# Patient Record
Sex: MALE | Race: WHITE
[De-identification: names, ages, dates, MRNs, and addresses within clinical notes are randomized per-mention and may not be internally consistent; named-entity substitution may affect disease eponyms.]

---

## 2021-09-13 ENCOUNTER — HOSPITAL ENCOUNTER (INPATIENT)
Dept: HOSPITAL 35 - ER | Age: 51
LOS: 15 days | Discharge: NURSING, REHAB OR LONG-TERM CARE | DRG: 177 | End: 2021-09-28
Attending: INTERNAL MEDICINE | Admitting: INTERNAL MEDICINE
Payer: COMMERCIAL

## 2021-09-13 VITALS — DIASTOLIC BLOOD PRESSURE: 95 MMHG | SYSTOLIC BLOOD PRESSURE: 170 MMHG

## 2021-09-13 VITALS — BODY MASS INDEX: 37.8 KG/M2 | HEIGHT: 75 IN | WEIGHT: 304 LBS

## 2021-09-13 DIAGNOSIS — E66.01: ICD-10-CM

## 2021-09-13 DIAGNOSIS — I11.0: ICD-10-CM

## 2021-09-13 DIAGNOSIS — U07.1: Primary | ICD-10-CM

## 2021-09-13 DIAGNOSIS — F41.1: ICD-10-CM

## 2021-09-13 DIAGNOSIS — J96.22: ICD-10-CM

## 2021-09-13 DIAGNOSIS — J12.82: ICD-10-CM

## 2021-09-13 DIAGNOSIS — R53.81: ICD-10-CM

## 2021-09-13 DIAGNOSIS — J96.21: ICD-10-CM

## 2021-09-13 DIAGNOSIS — Z89.512: ICD-10-CM

## 2021-09-13 DIAGNOSIS — Z83.3: ICD-10-CM

## 2021-09-13 DIAGNOSIS — E87.6: ICD-10-CM

## 2021-09-13 DIAGNOSIS — E87.8: ICD-10-CM

## 2021-09-13 DIAGNOSIS — F41.9: ICD-10-CM

## 2021-09-13 DIAGNOSIS — I50.33: ICD-10-CM

## 2021-09-13 DIAGNOSIS — E11.9: ICD-10-CM

## 2021-09-13 DIAGNOSIS — F32.9: ICD-10-CM

## 2021-09-13 DIAGNOSIS — T87.9: ICD-10-CM

## 2021-09-13 DIAGNOSIS — E87.1: ICD-10-CM
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
## 2021-09-13 DIAGNOSIS — E43: ICD-10-CM

## 2021-09-13 LAB
ALBUMIN SERPL-MCNC: 2.7 G/DL (ref 3.4–5)
ALT SERPL-CCNC: 22 U/L (ref 16–63)
ANION GAP SERPL CALC-SCNC: 8 MMOL/L (ref 7–16)
AST SERPL-CCNC: 28 U/L (ref 15–37)
BASOPHILS NFR BLD AUTO: 0.7 % (ref 0–2)
BE(VIVO): 1.5 MMOL/L
BILIRUB SERPL-MCNC: 0.5 MG/DL (ref 0.2–1)
BUN SERPL-MCNC: 20 MG/DL (ref 7–18)
CALCIUM SERPL-MCNC: 8.8 MG/DL (ref 8.5–10.1)
CHLORIDE SERPL-SCNC: 96 MMOL/L (ref 98–107)
CO2 SERPL-SCNC: 29 MMOL/L (ref 21–32)
CREAT SERPL-MCNC: 1.1 MG/DL (ref 0.7–1.3)
EOSINOPHIL NFR BLD: 0.4 % (ref 0–3)
ERYTHROCYTE [DISTWIDTH] IN BLOOD BY AUTOMATED COUNT: 14.6 % (ref 10.5–14.5)
GLUCOSE SERPL-MCNC: 113 MG/DL (ref 74–106)
GRANULOCYTES NFR BLD MANUAL: 82.4 % (ref 36–66)
HCO3 BLD-SCNC: 28.2 MMOL/L (ref 22–26)
HCT VFR BLD CALC: 25 % (ref 42–52)
HCT VFR BLD CALC: 27.3 % (ref 42–52)
HGB BLD-MCNC: 8.1 GM/DL (ref 14–18)
HGB BLD-MCNC: 8.9 GM/DL (ref 14–18)
LYMPHOCYTES NFR BLD AUTO: 9 % (ref 24–44)
MCH RBC QN AUTO: 27.2 PG (ref 26–34)
MCHC RBC AUTO-ENTMCNC: 32.2 G/DL (ref 28–37)
MCV RBC: 84.5 FL (ref 80–100)
MONOCYTES NFR BLD: 7.5 % (ref 1–8)
NEUTROPHILS # BLD: 3 THOU/UL (ref 1.4–8.2)
PCO2 BLD: 56.4 MMHG (ref 35–45)
PLATELET # BLD: 223 THOU/UL (ref 150–400)
PO2 BLD: 79 MMHG (ref 80–100)
POTASSIUM SERPL-SCNC: 5.4 MMOL/L (ref 3.5–5.1)
PROT SERPL-MCNC: 8 G/DL (ref 6.4–8.2)
RBC # BLD AUTO: 2.96 MIL/UL (ref 4.5–6)
SODIUM SERPL-SCNC: 133 MMOL/L (ref 136–145)
WBC # BLD AUTO: 3.6 THOU/UL (ref 4–11)

## 2021-09-13 PROCEDURE — XW033E5 INTRODUCTION OF REMDESIVIR ANTI-INFECTIVE INTO PERIPHERAL VEIN, PERCUTANEOUS APPROACH, NEW TECHNOLOGY GROUP 5: ICD-10-PCS | Performed by: INTERNAL MEDICINE

## 2021-09-13 PROCEDURE — 10879: CPT

## 2021-09-13 NOTE — EMS
[address]   [address]                     EMS Patient Care Report       
_______________________________________________________________________________
 
Name:       GRAZYNA PADILLA                 Room #:         349-I       ADM IN  
M.R.#:      [id number]                       Account #:      [id number]  
Admission:  21    Attend Phys:    Eulalio Chaparro MD   
Discharge:              Date of Birth:  [birth year]  
                                                          Report #: [id number]
                                                                    [id number]
_______________________________________________________________________________
THIS REPORT FOR:   //name//                          
 
Report Transmitted: 2021 00:22
EMS Care Summary
[place], [place]/[facility]
Incident [id number] @ 2021 07:14
 
Incident Location
[address] [place] [address]
 
Patient
GRAYZNA PADILLA
Male, 51 Years
 1970
 
Patient Address
[address] [place] [address]
 
Patient History
Diabetes,Gastro-Esophageal Reflux Disease 
(GERD),Depression,Anxiety,Anemia,Novel Coronavirus (COVID-19), 
 
Patient Allergies
Morphine,
 
Patient Medications
Albuterol, Zofran, Norvasc, Prilosec, Insulin, Gabapentin, Hydralazine, 
Potassium, Sodium ferric gluconate, Lasix, Iron, Labetalol, 
 
Chief Complaint
RESPIRATORY DISTRESS
 
Disposition
Transported No Lights/[facility]
 
Dispatch Reason
Breathing Problem
 
Transported To
[facility]
 
Narrative
SCENE:  ON ARRIVAL T FOUND LYING IN BED IN ROOM AT ADDRESS PROVIDED. PT IS ON 
 
 
 
[address]   [address]                     EMS Patient Care Report       
_______________________________________________________________________________
 
Name:       GRAZYNA PADILLA                 Room #:         349-I       ADM IN  
.R.#:      [id number]                       Account #:      [id number]  
Admission:  21    Attend Phys:    Eulalio Chaparro MD   
Discharge:              Date of Birth:  [birth year]  
                                                          Report #: [id number]
                                                                    [id number]
_______________________________________________________________________________
5LPM VIA NC PER NORMAL. STAFF REPORTS PT IS COVID POSITIVE AND SHE HAS BEEN 
UNABLE TO KEEP PT O2 SATURATION ABOVE 80 PERCENT. PT IS C/O SOB. PT LEFT FOOT 
AMPUTEE. PT DENIES ALL OTHER COMPLAINTS. PT SLID TO EMS STRETCHER. 
 
AMBULANCE: PT HAS CONGESTION AND WHEEZING IN THE LOWER FIELDS.  PT PLACED ON 
DUONEB TREATMENT EN ROUTE. PT REPORTS MILD RELIEF UPON ARRIVAL TO ED. NO OTHER 
CHANGES 
 
Initial Vitals
@07:26P: 114,R: 18,BP: 148/74,GCS: 15,Revised Trauma: 12,
@07:56P: 100,R: 18,Pain: 0/10,GCS: 15,Revised Trauma: 12,
 
Assessments
@07:27MENTAL:Person Oriented,Place Oriented,Time Oriented,Hallucinations,Event 
Oriented,SKIN:HEENT:Head/Face: No Abnormalities,Neck/Airway: No 
Abnormalities,LUNG SOUNDS:General: No Abnormalities,ABDOMEN:General: No 
Abnormalities,PELVIS//GI:No Abnormalities,EXTREMITIES:Left Leg: Other,Left 
Arm: No Abnormalities,Right Arm: No Abnormalities,Right Leg: No 
Abnormalities,PULSE:NEURO:No 
Abnormalities,@07:40MENTAL:Hallucinations,SKIN:HEENT:Head/Face: No 
Abnormalities,Eyes: No Abnormalities,Neck/Airway: No Abnormalities,LUNG 
SOUNDS:General: No Abnormalities,Left Upper: No Abnormalities,Right Upper: No 
Abnormalities,Left Lower: No Abnormalities,Right Lower: No 
Abnormalities,ABDOMEN:General: No Abnormalities,Left Upper: No 
Abnormalities,Right Upper: No Abnormalities,Left Lower: No Abnormalities,Right 
Lower: No Abnormalities,PELVIS//GI:No Abnormalities,EXTREMITIES:Left Leg: 
Other,Left Arm: No Abnormalities,Right Arm: No Abnormalities,Right Leg: No 
Abnormalities,PULSE:NEURO:No Abnormalities, 
 
Impression
Acute Respiratory Distress (Dyspnea)
 
Procedures
@07:26ALS AssessmentResponse: UnchangedSucceeded@PTAOxygen FlowRate: 5 Device: 
Nasal Cannula (NC) Response: UnchangedSucceeded@07:30StretcherResponse: 
Unchanged@07:35Albuterol - 2.5 Milligrams (mg) - NebulizedResponse: 
Improved@07:35Atrovent - 0.5 Milligrams (mg) - NebulizedResponse: Improved 
 
Timeline
PTA,Oxygen FlowRate: 5 Device: Nasal Cannula (NC) Response: UnchangedSucceeded,
07:11,Call Received
07:11,Dispatch Notified
07:14,Dispatched
07:14,En Route
07:23,On Scene
07:25,At Patient
 
 
 
[address]   [address]                     EMS Patient Care Report       
_______________________________________________________________________________
 
Name:       GRAZYNA PADILLA                 Room #:         349-I       ADM IN  
M.R.#:      [id number]                       Account #:      [id number]  
Admission:  21    Attend Phys:    Eulalio Chaparro MD   
Discharge:              Date of Birth:  [birth year]  
                                                          Report #: [id number]
                                                                    [id number]
_______________________________________________________________________________
07:26,BP: 148/74 M,PULSE: 114,RR: 18 R,SPO2:  Ox,ETCO2:  ,BG: ,PAIN: ,GCS: 15,
07:26,ALS Assessment,Response: UnchangedSucceeded,
07:30,Stretcher,Response: Unchanged
07:32,Depart Scene
07:35,Atrovent - 0.5 Milligrams (mg) - Nebulized,Response: Improved
07:35,Albuterol - 2.5 Milligrams (mg) - Nebulized,Response: Improved
07:42,At Destination
07:56,BP: 188/ M,PULSE: 100,RR: 18 R,SPO2:  Ox,ETCO2:  ,BG: ,PAIN: 0,GCS: 15,
07:57,Call Closed
 
Disclaimer
v1.1     Copyright  [employer]
This EMS Care Summary contains data elements from the applicable legal record 
(which may be displayed differently). It is designed to provide pertinent 
information for the following purposes: continuity of care, clinical quality, 
and state data reporting. The complete legal record is available to ED staff 
and administrators of the receiving hospital in [facility]'s Patient Tracker. All data 
is provided "as is."

## 2021-09-13 NOTE — EKG
[address] [employer]
[place]  [address]
Phone:  [phone]                    ELECTROCARDIOGRAM REPORT      
_______________________________________________________________________________
 
Name:       GRAZYNA PADILLA                 [facility] #[facility]                     [facility]      [id number]     Account #:      [id number]  
Admission:  21    Attend Phys:                          
Discharge:              Date of Birth:  [birth year]  
                                                          Report #: [id number]
   [id number]
_______________________________________________________________________________
                         [facility] ED
                                       
Test Date:    2021               Test Time:    07:56:54
Pat Name:     GRAZYNA PADILLA            Department:   
Patient ID:   [id number]            Room:          
Gender:       M                        Technician:   
:          1970               Requested By: Juan Reardon
Order Number: [id number] MD:   Chandler Cline
                                 Measurements
Intervals                              Axis          
Rate:         83                       P:            55
MN:           132                      QRS:          14
QRSD:         74                       T:            29
QT:           402                                    
QTc:          473                                    
                           Interpretive Statements
Sinus rhythm
Ventricular premature complex
Low voltage
No previous ECG available for comparison
Electronically Signed On 2021 16:18:06 CDT by Chandler Cline
[email or web address]
 
 
 
 
 
 
 
 
 
 
 
 
 
 
 
 
 
 
 
 
 
  <ELECTRONICALLY SIGNED>
   By: Chandler Cline MD, [facility]   
  21     1618
D: 21 0756                           _____________________________________
T: 21 0756                           Chandler Cline MD, FACC     /EPI

## 2021-09-14 VITALS — DIASTOLIC BLOOD PRESSURE: 89 MMHG | SYSTOLIC BLOOD PRESSURE: 120 MMHG

## 2021-09-14 VITALS — SYSTOLIC BLOOD PRESSURE: 137 MMHG | DIASTOLIC BLOOD PRESSURE: 72 MMHG

## 2021-09-14 VITALS — DIASTOLIC BLOOD PRESSURE: 99 MMHG | SYSTOLIC BLOOD PRESSURE: 198 MMHG

## 2021-09-14 VITALS — DIASTOLIC BLOOD PRESSURE: 84 MMHG | SYSTOLIC BLOOD PRESSURE: 156 MMHG

## 2021-09-14 VITALS — SYSTOLIC BLOOD PRESSURE: 154 MMHG | DIASTOLIC BLOOD PRESSURE: 85 MMHG

## 2021-09-14 LAB
ALBUMIN SERPL-MCNC: 2.4 G/DL (ref 3.4–5)
ALT SERPL-CCNC: 21 U/L (ref 30–65)
ANION GAP SERPL CALC-SCNC: 8 MMOL/L (ref 7–16)
AST SERPL-CCNC: 27 U/L (ref 15–37)
BILIRUB DIRECT SERPL-MCNC: 0.1 MG/DL
BILIRUB SERPL-MCNC: 0.3 MG/DL (ref 0.2–1)
BUN SERPL-MCNC: 23 MG/DL (ref 7–18)
CALCIUM SERPL-MCNC: 8.7 MG/DL (ref 8.5–10.1)
CHLORIDE SERPL-SCNC: 96 MMOL/L (ref 98–107)
CO2 SERPL-SCNC: 29 MMOL/L (ref 21–32)
CREAT SERPL-MCNC: 1 MG/DL (ref 0.7–1.3)
ERYTHROCYTE [DISTWIDTH] IN BLOOD BY AUTOMATED COUNT: 14.8 % (ref 10.5–14.5)
GLUCOSE SERPL-MCNC: 162 MG/DL (ref 74–106)
HCT VFR BLD CALC: 24.2 % (ref 42–52)
HGB BLD-MCNC: 8 GM/DL (ref 14–18)
MCH RBC QN AUTO: 27.3 PG (ref 26–34)
MCHC RBC AUTO-ENTMCNC: 32.9 G/DL (ref 28–37)
MCV RBC: 83.1 FL (ref 80–100)
PHOSPHATE SERPL-MCNC: 4.6 MG/DL (ref 2.5–4.9)
PLATELET # BLD: 232 THOU/UL (ref 150–400)
POTASSIUM SERPL-SCNC: 4.8 MMOL/L (ref 3.5–5.1)
PROT SERPL-MCNC: 6.8 G/DL (ref 6.4–8.2)
RBC # BLD AUTO: 2.92 MIL/UL (ref 4.5–6)
SODIUM SERPL-SCNC: 133 MMOL/L (ref 136–145)
WBC # BLD AUTO: 4.9 THOU/UL (ref 4–11)

## 2021-09-15 VITALS — SYSTOLIC BLOOD PRESSURE: 148 MMHG | DIASTOLIC BLOOD PRESSURE: 91 MMHG

## 2021-09-15 VITALS — DIASTOLIC BLOOD PRESSURE: 82 MMHG | SYSTOLIC BLOOD PRESSURE: 141 MMHG

## 2021-09-15 VITALS — DIASTOLIC BLOOD PRESSURE: 79 MMHG | SYSTOLIC BLOOD PRESSURE: 153 MMHG

## 2021-09-15 VITALS — SYSTOLIC BLOOD PRESSURE: 145 MMHG | DIASTOLIC BLOOD PRESSURE: 74 MMHG

## 2021-09-15 VITALS — SYSTOLIC BLOOD PRESSURE: 149 MMHG | DIASTOLIC BLOOD PRESSURE: 88 MMHG

## 2021-09-15 LAB
ALBUMIN SERPL-MCNC: 2.6 G/DL (ref 3.4–5)
ALT SERPL-CCNC: 20 U/L (ref 30–65)
ANION GAP SERPL CALC-SCNC: 9 MMOL/L (ref 7–16)
AST SERPL-CCNC: 19 U/L (ref 15–37)
BASOPHILS NFR BLD AUTO: 0.3 % (ref 0–2)
BILIRUB DIRECT SERPL-MCNC: 0.1 MG/DL
BILIRUB SERPL-MCNC: 0.3 MG/DL (ref 0.2–1)
BUN SERPL-MCNC: 20 MG/DL (ref 7–18)
CALCIUM SERPL-MCNC: 8.5 MG/DL (ref 8.5–10.1)
CHLORIDE SERPL-SCNC: 96 MMOL/L (ref 98–107)
CO2 SERPL-SCNC: 28 MMOL/L (ref 21–32)
CREAT SERPL-MCNC: 1 MG/DL (ref 0.7–1.3)
EOSINOPHIL NFR BLD: 0 % (ref 0–3)
ERYTHROCYTE [DISTWIDTH] IN BLOOD BY AUTOMATED COUNT: 14.7 % (ref 10.5–14.5)
GLUCOSE SERPL-MCNC: 159 MG/DL (ref 74–106)
GRANULOCYTES NFR BLD MANUAL: 84 % (ref 36–66)
HCT VFR BLD CALC: 25.9 % (ref 42–52)
HGB BLD-MCNC: 8.7 GM/DL (ref 14–18)
LYMPHOCYTES NFR BLD AUTO: 6.6 % (ref 24–44)
MCH RBC QN AUTO: 27.9 PG (ref 26–34)
MCHC RBC AUTO-ENTMCNC: 33.5 G/DL (ref 28–37)
MCV RBC: 83.3 FL (ref 80–100)
MONOCYTES NFR BLD: 9.1 % (ref 1–8)
NEUTROPHILS # BLD: 3.9 THOU/UL (ref 1.4–8.2)
PHOSPHATE SERPL-MCNC: 4.7 MG/DL (ref 2.5–4.9)
PLATELET # BLD: 275 THOU/UL (ref 150–400)
POTASSIUM SERPL-SCNC: 5 MMOL/L (ref 3.5–5.1)
PROT SERPL-MCNC: 6.8 G/DL (ref 6.4–8.2)
RBC # BLD AUTO: 3.11 MIL/UL (ref 4.5–6)
SODIUM SERPL-SCNC: 133 MMOL/L (ref 136–145)
WBC # BLD AUTO: 4.7 THOU/UL (ref 4–11)

## 2021-09-15 NOTE — NUR
INITIAL ASSESSMENT:
HAILEY reviewed chart and spoke with nursing and attending physician. Pt was
admitted from [facility] due to COVID/hypoxia. Pt placed in
Enhanced Isolation. Per chart, pt had positive COVID test 4 days prior to
admission and has not received a COVID vaccination. HAILEY placed call to pt's
room. No answer. Per chart, pt is alert/orientated x 4. Pt is afebrile and on
4L of O2. Pt is on IV abx, IV steroids and Remdesivir. PT/OT evals ordered. Pt
with hx DM/HTN and had left [facility] on June of 2021. HAILEY faxed clinical info to
[facility] post-acute liaison for review. HAILEY left message at [facility] to
request date of pt's positive COVID test date. HAILEY is following to assist as
needed with discharge planning.

## 2021-09-15 NOTE — 2DMMODE
[facility]
[address] [place]   [address]                   2 D/M-MODE ECHOCARDIOGRAM     
_______________________________________________________________________________
 
Name:       GRAZYNA PADILLA                 Room #:         349-I       ADM IN  
M.R.#:      [id number]                       Account #:      [id number]  
Admission:  09/13/21    Attend Phys:    Eulalio Chaparro MD   
Discharge:              Date of Birth:  [birth year]  
                                                          Report #: [id number]
                                                                    [id number]
_______________________________________________________________________________
THIS REPORT FOR:  
 
cc:  Gregorio Hollis MD, Srinath MD Park, Jin S. MD                                                   
                                                                       ~
 
--------------- APPROVED REPORT --------------
 
 
Study performed:  09/15/2021 13:57:14
 
EXAM: Limited 2D, Doppler, and color-flow Echocardiogram 
Patient Location: Bedside   
Room #:  349     Status:  routine
 
       BSA:         2.75
HR: 108 bpm  BP:          141/82 mmHg 
Rhythm: Tachycardia     
 
Other Information 
Study Quality: Adequate
Technically limited study due to  morbid 
obesity..
 
Indications
Abbreviated echo done on a COVID + patient.
Short of breath. Question diastolic heart failure.
Hx: HTN, DM LBKA.
 
Aortic Valve
AoV Peak Daniel.:  1.78 m/s 
AO Peak Gr.:  12.71 mmHg 
 
Mitral Valve
    E/A Ratio:  1.1
    MV Decel. Time:  227.68 ms
MV E Max Daniel.:  1.14 m/s 
MV A Daniel.:  1.03 m/s  
MV PHT:  66.03 ms  
IVRT:  51.90 ms   
 
Tricuspid Valve
TR Peak Daniel.:  2.93 m/s  RAP Estimate:  15.00 mmHg
TR Peak Gr.:  34.23 mmHg 
    PA Pressure:  49.00 mmHg
 
 
[facility]
[address]
[place]  [address]
Phone:  [phone]                    2 D/M-MODE ECHOCARDIOGRAM     
_______________________________________________________________________________
 
Name:            GRAZYNA PADILLA                 Room #:        349-I       ADM IN
M.R.#:           [id number]          Account #:     [id number]  
Admission:       09/13/21         Attend Phys:   JOHNSON Tam
Discharge:                  Date of Birth: [birth year]  
                         Report #:      [id number]
        [id number]
_______________________________________________________________________________
 
Left Ventricle
The left ventricle is normal size. There is normal LV segmental wall 
motion. There is normal left ventricular wall thickness. Left 
ventricular systolic function is normal. LVEF is 55-60%. Moderate 
diastolic dysfunction is present (pseudonormal filling).
 
Right Ventricle
The right ventricle is normal size. The right ventricular systolic 
function is normal.
 
Atria
Left atrium appears mildly dilated. The right atrium size is 
normal.
 
Aortic Valve
Aortic valve is mildly calcified. No aortic regurgitation is present. 
There is no aortic valvular stenosis.
 
Mitral Valve
The mitral valve is normal in structure. Trace mitral 
regurgitation.
 
Tricuspid Valve
The tricuspid valve is normal in structure. Mild tricuspid 
regurgitation. Estimated PAP is 46mmHg.
 
Pulmonic Valve
The pulmonary valve is normal in structure.
 
Great Vessels
IVC is dilated and collapses <50% with 
inspiration.
 
Pericardium
There is no pericardial effusion. Right pleural effusion 
noted.
 
<Conclusion>
The left ventricle is normal size.
There is normal left ventricular wall thickness.
Left ventricular systolic function is normal.
Moderate diastolic dysfunction is present (pseudonormal filling).
The right ventricle is normal size.
Left atrium appears mildly dilated.
Aortic valve is mildly calcified.
 
 
[facility]
[address]
[place]  [address]
Phone:  [phone]                    2 D/M-MODE ECHOCARDIOGRAM     
_______________________________________________________________________________
 
Name:            GRAZYNA PADILLA                 Room #:        349-I       ADM IN
M.R.#:           [id number]          Account #:     [id number]  
Admission:       09/13/21         Attend Phys:   JOHNSON Tam
Discharge:                  Date of Birth: [birth year]  
                         Report #:      [id number]
        [id number]
_______________________________________________________________________________
Trace mitral regurgitation.
Mild tricuspid regurgitation. Estimated PAP is 46mmHg.
 
 
 
 
 
 
 
 
 
 
 
 
 
 
 
 
 
 
 
 
 
 
 
 
 
 
 
 
 
 
 
 
 
 
 
 
 
 
 
 
 
 
  <ELECTRONICALLY SIGNED>
   By: Hasmukh Hood MD               
  09/15/21     1453
D: 09/15/21 1453                           _____________________________________
T: 09/15/21 1453                           Hasmukh Hood MD                 /INF

## 2021-09-15 NOTE — HC
[facility]
[address]
[place]   [address]                     CONSULTATION                  
_______________________________________________________________________________
 
Name:       GRAZYNA PADILLA                 Room #:         349-I       ADM IN  
M.R.#:      [id number]                       Account #:      [id number]  
Admission:  09/13/21    Attend Phys:    Eulalio Chaparro MD   
Discharge:              Date of Birth:  [birth year]  
                                                          Report #: [id number]
                                                                    [id number] 
_______________________________________________________________________________
THIS REPORT FOR:  
 
cc:  Gregorio Hollis MD, Srinath MD Barry,Melchor LUNA MD                                           ~
 
DATE OF SERVICE: 09/14/2021
 
INFECTIOUS CONSULTATION
 
ATTENDING PHYSICIAN:  Dr. Chaparro.
 
REASON FOR EVALUATION:  COVID-19 infection, complicated by pneumonitis, 
respiratory failure.
 
HISTORY OF PRESENT ILLNESS:  Chart was reviewed.  The patient examined.  This is
a 51-year-old gentleman with known history of diabetes mellitus, has been 
complicated by vasculopathy, has previous left below-knee amputation in June of 
this year and has been in a rehabilitation facility as well as skilled nursing. 
He developed some dyspnea, cough, productive sputum, decreased sense of smell 
and taste.  He was confirmed to have a COVID positive test four days prior to 
admission.  While there, he developed more hypoxemic.  He was placed on 
supplemental oxygen 5 liters per nasal cannula.  Additional evaluation included 
chest x-ray which showed bilateral diffuse infiltrates, question of edema versus
multifocal pneumonia.  White count of 3.6.  Electrolytes:  Sodium of 133.  
ProBNP of 1299.  D-dimer 4.77.  Blood cultures collected on admission negative 
thus far.  He did some ABGs this morning, pH 7.317, pCO2 of 56.4, pO2 of 79.0 on
5 liters.  He is not noted to have any fever since he has been in.  He is quite 
anxious.  He was empirically started on therapy with azithromycin, ceftriaxone, 
remdesivir, corticosteroids.
 
ALLERGIES:  MORPHINE.
 
CURRENT MEDICINES:  Described above in addition to the antibiotics, 
pantoprazole, insulin, amlodipine, ipratropium, albuterol inhaler.
 
PAST MEDICAL HISTORY:  As described above, diabetes mellitus type 2, left 
below-knee amputation, hypertension, anemia, depression, anxiety, reflux.
 
SOCIAL HISTORY:  Nonsmoker, no ethanol, no illicit drug use.
 
FAMILY HISTORY:  Noncontributory.
 
REVIEW OF SYSTEMS:  Otherwise, unremarkable.
 
PHYSICAL EXAMINATION:
 
 
 
[facility]
[address] [place]   [address]                     CONSULTATION                  
_______________________________________________________________________________
 
Name:       GRAZYNA PADILLA                 Room #:         349-I       [facility] [place]  
[facility].#:      [id number]                       Account #:      [id number]  
Admission:  09/13/21    Attend Phys:    Eulalio Chaparro MD   
Discharge:              Date of Birth:  [birth year]  
                                                          Report #: [id number]
                                                                    [id number] 
_______________________________________________________________________________
 
GENERAL:  He is anxious, mild to moderate distress, generally lucid.
VITAL SIGNS:  Temperature 98.4, pulse 102, respirations 24, blood pressure 
120/89.
SKIN:  Warm, dry, no rashes.
HEENT:  Normocephalic.  Extraocular muscles intact.  Nasal cannula in place at 
____ liters.
NECK:  Supple.
LUNGS:  Few scattered coarse breath sounds.
HEART:  Regular.  No appreciated murmur.
ABDOMEN:  Soft, is obese, nontender.
EXTREMITIES:  No cyanosis.
GENITOURINARY AND RECTAL:  Deferred.
 
LABORATORY DATA:  As described above, pH 7.317, pCO2 of 56.4, pO2 of 79 on 5 
liters.  Electrolytes:  Sodium 133, potassium 5.4, chloride 96, bicarbonate is 
29, anion gap of 8, BUN and creatinine 20 and 1.1, glucose of 113, albumin of 
2.7.  White count of 3.6, H and H 8.1 and 25.0, platelets of 223.
 
ASSESSMENT AND PLAN:  COVID-19 infection, complicated by pneumonitis, 
respiratory failure in the setting of diabetes mellitus, hypertension, obesity. 
We will continue current approach as prescribed including the directed therapy 
with remdesivir, corticosteroids, as well as empiric therapy, ceftriaxone, 
azithromycin.  We will add vitamins and discuss with pharmacy ____ and remains 
somewhat tenuous.  Monitor expectantly, oxygen therapy as required.
 
 
 
 
 
 
 
 
 
 
 
 
 
 
 
 
 
 
 
  <ELECTRONICALLY SIGNED>
   By: Melchor Hartman MD           
  09/15/21     1024
D: 09/14/21 1050                           _____________________________________
T: 09/14/21 1932                           Melchor Hartman MD             /nt

## 2021-09-15 NOTE — NUR
PROGRESS
 
PT A/O X4, ANXIOUS AT TIMES. LUNGS ARE COARSE AND WHEEZY PT HAS A PRODUCTIVE
COUGH. BRINGING UP SOME GRAY THICK SPUTUM SAMPLE SENT TO LAB. VSS, HAD SOME
NAUSEA AND VOMITING ABOUT 300CC'S OF LIGHT BROWN EMESIS, AT
START OF SHIFT BUT RESOLVED WITH A DOSE OF ZOFRAN. NOT
OOB THIS SHIFT PT HAD A RECENT LBKA AND HAS NO PROSTHESIS YET SO UNABLE TO
AMBULATE. VOIDING IN BATH BASIN IN PLACE OF URINAL VOIDED 500CC OF CLEAR DARK
YELLOW URINE. ORDER FOR STOOL SOFTNER OBTAINED AS PT CANNOT REMEMBER WHEN HIS
LAST BM WAS. ANTIBIOTICS ADMINITERED AS ORDERED. VSS CONTINUE POC.

## 2021-09-16 VITALS — SYSTOLIC BLOOD PRESSURE: 148 MMHG | DIASTOLIC BLOOD PRESSURE: 87 MMHG

## 2021-09-16 VITALS — DIASTOLIC BLOOD PRESSURE: 87 MMHG | SYSTOLIC BLOOD PRESSURE: 153 MMHG

## 2021-09-16 VITALS — SYSTOLIC BLOOD PRESSURE: 154 MMHG | DIASTOLIC BLOOD PRESSURE: 93 MMHG

## 2021-09-16 VITALS — DIASTOLIC BLOOD PRESSURE: 78 MMHG | SYSTOLIC BLOOD PRESSURE: 150 MMHG

## 2021-09-16 VITALS — SYSTOLIC BLOOD PRESSURE: 146 MMHG | DIASTOLIC BLOOD PRESSURE: 89 MMHG

## 2021-09-16 LAB
ALBUMIN SERPL-MCNC: 2.4 G/DL (ref 3.4–5)
ALT SERPL-CCNC: 20 U/L (ref 16–63)
ANION GAP SERPL CALC-SCNC: 2 MMOL/L (ref 7–16)
AST SERPL-CCNC: 22 U/L (ref 15–37)
BASOPHILS NFR BLD AUTO: 0.6 % (ref 0–2)
BILIRUB DIRECT SERPL-MCNC: < 0.1 MG/DL
BILIRUB SERPL-MCNC: 0.2 MG/DL (ref 0.2–1)
BUN SERPL-MCNC: 18 MG/DL (ref 7–18)
CALCIUM SERPL-MCNC: 8.6 MG/DL (ref 8.5–10.1)
CHLORIDE SERPL-SCNC: 103 MMOL/L (ref 98–107)
CO2 SERPL-SCNC: 34 MMOL/L (ref 21–32)
CREAT SERPL-MCNC: 0.9 MG/DL (ref 0.7–1.3)
EOSINOPHIL NFR BLD: 0 % (ref 0–3)
ERYTHROCYTE [DISTWIDTH] IN BLOOD BY AUTOMATED COUNT: 14.7 % (ref 10.5–14.5)
GLUCOSE SERPL-MCNC: 168 MG/DL (ref 74–106)
GRANULOCYTES NFR BLD MANUAL: 75.5 % (ref 36–66)
HCT VFR BLD CALC: 25.3 % (ref 42–52)
HGB BLD-MCNC: 8.1 GM/DL (ref 14–18)
IRON SERPL-MCNC: 50 UG/DL (ref 65–175)
LYMPHOCYTES NFR BLD AUTO: 12.7 % (ref 24–44)
MCH RBC QN AUTO: 27 PG (ref 26–34)
MCHC RBC AUTO-ENTMCNC: 32.1 G/DL (ref 28–37)
MCV RBC: 84.1 FL (ref 80–100)
MONOCYTES NFR BLD: 11.2 % (ref 1–8)
NEUTROPHILS # BLD: 2.9 THOU/UL (ref 1.4–8.2)
PHOSPHATE SERPL-MCNC: 4.1 MG/DL (ref 2.6–4.7)
PLATELET # BLD: 290 THOU/UL (ref 150–400)
POTASSIUM SERPL-SCNC: 4.4 MMOL/L (ref 3.5–5.1)
PROT SERPL-MCNC: 7.3 G/DL (ref 6.4–8.2)
RBC # BLD AUTO: 3.01 MIL/UL (ref 4.5–6)
SAO2 % BLD FROM PO2: 32 % (ref 20–39)
SODIUM SERPL-SCNC: 139 MMOL/L (ref 136–145)
TIBC SERPL-MCNC: 155 UG/DL (ref 250–450)
WBC # BLD AUTO: 3.8 THOU/UL (ref 4–11)

## 2021-09-16 NOTE — NUR
SW reviewed chart and spoke with nursing and attending physician. Pt remains
in Enhanced Isolation due to COVID. Pt is afebrile and on 2-4L of O2.  Pt is
on IV abx, IV lasix and IV steroids. Remdesivir has been started. Discharge
back to [facility] is anticipated in 1-2 days. HAILEY placed call to
pt's room. No answer. HAILEY left voice message for pt's mother, Jane
([phone]), to provide update and confirm discharge plan. HAILEY updated
[facility] post-acute liaison. HAILEY is following to assist as needed with discharge
planning.

## 2021-09-16 NOTE — NUR
Assumed pt's care beginning of this shift. Alert and oriented. Remained on 4L
O2 this shift. Meds given per emar. Pt continuee to sleep well this shift.
Pt's mother called beginning of shift for update, and was updated. Fall
precaution in place. PRN xanax given at HS per pt's request. Voiding via
urinal. LFA IV SL. Cdiff result pending. Nursing to continue to monitor.

## 2021-09-17 VITALS — SYSTOLIC BLOOD PRESSURE: 144 MMHG | DIASTOLIC BLOOD PRESSURE: 84 MMHG

## 2021-09-17 VITALS — DIASTOLIC BLOOD PRESSURE: 85 MMHG | SYSTOLIC BLOOD PRESSURE: 149 MMHG

## 2021-09-17 VITALS — DIASTOLIC BLOOD PRESSURE: 89 MMHG | SYSTOLIC BLOOD PRESSURE: 154 MMHG

## 2021-09-17 VITALS — DIASTOLIC BLOOD PRESSURE: 93 MMHG | SYSTOLIC BLOOD PRESSURE: 172 MMHG

## 2021-09-17 VITALS — SYSTOLIC BLOOD PRESSURE: 156 MMHG | DIASTOLIC BLOOD PRESSURE: 90 MMHG

## 2021-09-17 LAB
ALBUMIN SERPL-MCNC: 2.2 G/DL (ref 3.4–5)
ALT SERPL-CCNC: 22 U/L (ref 16–63)
ANION GAP SERPL CALC-SCNC: 7 MMOL/L (ref 7–16)
AST SERPL-CCNC: 27 U/L (ref 15–37)
BASOPHILS NFR BLD AUTO: 0.2 % (ref 0–2)
BILIRUB DIRECT SERPL-MCNC: < 0.1 MG/DL
BILIRUB SERPL-MCNC: 0.1 MG/DL (ref 0.2–1)
BUN SERPL-MCNC: 22 MG/DL (ref 7–18)
CALCIUM SERPL-MCNC: 8.6 MG/DL (ref 8.5–10.1)
CHLORIDE SERPL-SCNC: 99 MMOL/L (ref 98–107)
CO2 SERPL-SCNC: 32 MMOL/L (ref 21–32)
CREAT SERPL-MCNC: 0.9 MG/DL (ref 0.7–1.3)
EOSINOPHIL NFR BLD: 0 % (ref 0–3)
ERYTHROCYTE [DISTWIDTH] IN BLOOD BY AUTOMATED COUNT: 14.8 % (ref 10.5–14.5)
GLUCOSE SERPL-MCNC: 165 MG/DL (ref 74–106)
GRANULOCYTES NFR BLD MANUAL: 74 % (ref 36–66)
HCT VFR BLD CALC: 23.7 % (ref 42–52)
HGB BLD-MCNC: 8 GM/DL (ref 14–18)
LYMPHOCYTES NFR BLD AUTO: 14.4 % (ref 24–44)
MCH RBC QN AUTO: 28.1 PG (ref 26–34)
MCHC RBC AUTO-ENTMCNC: 33.8 G/DL (ref 28–37)
MCV RBC: 83.1 FL (ref 80–100)
MONOCYTES NFR BLD: 11.4 % (ref 1–8)
NEUTROPHILS # BLD: 2.9 THOU/UL (ref 1.4–8.2)
PHOSPHATE SERPL-MCNC: 3.2 MG/DL (ref 2.6–4.7)
PLATELET # BLD: 272 THOU/UL (ref 150–400)
POTASSIUM SERPL-SCNC: 4.2 MMOL/L (ref 3.5–5.1)
PROT SERPL-MCNC: 7.1 G/DL (ref 6.4–8.2)
RBC # BLD AUTO: 2.86 MIL/UL (ref 4.5–6)
SODIUM SERPL-SCNC: 138 MMOL/L (ref 136–145)
WBC # BLD AUTO: 3.9 THOU/UL (ref 4–11)

## 2021-09-17 NOTE — NUR
HAILEY reviewed chart and spoke with nursing and attending physician. Pt remains
in Enhanced Isolation. Pt is afebrile and on 2L of O2. Pt is on IV abx, IV
steroids and IV lasix. No weekend discharge planned. HAILEY discussed case with [facility]
rehab liaison. Consult to be ordered today.  HAILEY placed call to pt's room. No
answer. HAILEY spoke with pt's mother, Jane, via phone. Introduced role of HAILEY.
Pt is alert/orientated x 4. Pt has lived with his mother for the past 4 years
following amputation of several toes. Pt is unemployed and does not have any
income. Pt was admitted to [facility] on June 25, 2021 and had toe
amputation on June 30th. Pt was discharged to [facility], and then was
transferred to [facility] after having positive COVID test on 9/9.
Pt's mother states he has been receiving some therapy at the facility. Pt had
an appt to be measured by Nancy for prosthesis, but that appt was cancelled
when pt was transferred to [facility]. HAILEY discussed possible
admission to inpt acute rehab if pt meets criteria. SW provided options for
inpt acute rehab facilities. Pt and family lives in [facility]. Pt's mother
would like for pt to decide on facility. Eventual discharge plans discussed.
Pt's mother asked what pt will need when discharged from a facility. HAILEY
explained that discharge needs will have to be determined once pt gets a
prosthesis and is able to work with therapy. Pt's mother verbalized
understanding. HAILEY will continue to reach pt to discuss discharge plans. HAILEY is
following to assist as needed with discharge planning.

## 2021-09-17 NOTE — NUR
assumed care of pt at 0700. pt aox4 no acute distress.  2L NC.  increased
diuretics today.  swelling showing improvement. over 7000cc urinary output
this shift.  up with physical therapy and sliding board.  no events on
telemetry.  wcm.

## 2021-09-17 NOTE — NUR
PT MAKING SLOW PROGRESS TOWARDS GOALS.  ON O2 AT 2L PER NC OVERNIGHT.  LUNGS
DIMINISHED WITH OCCASIONAL AREAS OF MILD/FAINT WHEEZES.
OCCASIONAL NONPRODUCTIVE COUGH NOTED.  SEE CHARTING.

## 2021-09-17 NOTE — HC
[facility]
[address] [place]   [address]                     CONSULTATION                  
_______________________________________________________________________________
 
Name:       GRAZYNA PADILLA                 Room #:         349-I       ADM IN  
M.R.#:      [id number]                       Account #:      [id number]  
Admission:  09/13/21    Attend Phys:    Eulalio Chaparro MD   
Discharge:              Date of Birth:  [birth year]  
                                                          Report #: [id number]
                                                                    [id number] 
_______________________________________________________________________________
THIS REPORT FOR:  
 
cc:  Gregorio Hollis MD, Srinath MD Althoff,Mekhi CARRASCO MD                                        ~
 
DATE OF SERVICE: 09/14/2021
 
CHIEF COMPLAINT:   Left BKA site.
 
HISTORY OF PRESENT ILLNESS:  This is a 51-year-old male patient admitted to the 
hospital with shortness of breath since Friday.  He was diagnosed with COVID.  
He has had decreasing oxygen saturations.  He has had a prior left above-knee 
amputation, some areas have not healed.  I have been asked to see him with 
regard to wound care.  The patient denies much pain in that area.
 
PAST MEDICAL HISTORY:  Positive for diabetes mellitus, hypertension, previous 
BKA, possible congestive heart failure, and morbid obesity.
 
SOCIAL HISTORY:  Negative for current alcohol or tobacco use.
 
FAMILY HISTORY:  Noncontributory.
 
MEDICATIONS:  Include Cymbalta, ProAir, Slow Fe, Neurontin, Apresoline, 
Trendate, furosemide, Norvasc, Klor-Con, omeprazole, Zofran.
 
ALLERGIES:  MORPHINE.
 
REVIEW OF SYSTEMS:
CONSTITUTIONAL:  The patient denies fever, chills or weight loss.
NEUROLOGICAL:  The patient denies focal weakness, numbness or tingling.
EYES:  The patient denies any visual changes, redness or drainage.
ENT:  The patient denies earache, nasal drainage, sore throat.
CARDIOVASCULAR:  The patient denies chest pain, palpitation, diaphoresis.
PULMONARY:  The patient does complain of shortness of breath and cough.
GASTROINTESTINAL:  Denies nausea, vomiting, diarrhea or abdominal pain.
ORTHOPEDIC:  The patient notes his left BKA, he states he has been treated with 
topical Betadine.
 
Others systems in a 14-point review of systems are negative.
 
PHYSICAL EXAMINATION:
VITAL SIGNS:  At this time include temperature 36.6, pulse 107, respiration of 
20, blood pressure 134/85.
GENERAL:  This is a somewhat chronically ill-appearing male.  The patient 
appears to be in minimal distress.
 
 
 
[place] [id number]                     CONSULTATION                  
_______________________________________________________________________________
 
Name:       GRAZYNA PADILLA                 Room #:         349-I       ADM IN  
M.R.#:      [id number]                       Account #:      [id number]  
Admission:  09/13/21    Attend Phys:    Eulalio Chaparro MD   
Discharge:              Date of Birth:  [birth year]  
                                                          Report #: [id number]
                                                                    [id number] 
_______________________________________________________________________________
 
HEENT:  Head normocephalic.  Nose and throat are clear.
LUNGS:  Diminished.
HEART:  Regular rhythm.
ABDOMEN:  Soft, obese, nontender.
EXTREMITIES:  Left BKA shows areas of dry eschar, some of which peels away 
revealing intact epithelium beneath, few areas remain adherent.  There appears 
to be a tattoo on the lateral leg.  There does not appear to be any tissue loss 
or other problems noted here.
 
CLINICAL IMPRESSION:
1.  A surgical incision left BKA with multiple superficial residual ulcerations.
2.  COVID with acute hypoxic respiratory failure.
3.  Type 2 diabetes mellitus.
4.  Hypertension.
5.  Morbid obesity.
6.  Moderate protein calorie malnutrition, albumin 2.8.
 
RECOMMENDATIONS:  We will paint the BKA incision sites with Betadine and 
otherwise leave open to air.  Active medical management of his COVID infection, 
diabetes, hypertension.  Appreciate being asked to see him in consultation.
 
 
 
 
 
 
 
 
 
 
 
 
 
 
 
 
 
 
 
 
 
 
 
  <ELECTRONICALLY SIGNED>
   By: Mekhi Carreno MD        
  09/17/21     0914
D: 09/15/21 1730                           _____________________________________
T: 09/16/21 0035                           Mekhi Carreno MD          /nt

## 2021-09-18 VITALS — SYSTOLIC BLOOD PRESSURE: 145 MMHG | DIASTOLIC BLOOD PRESSURE: 76 MMHG

## 2021-09-18 VITALS — SYSTOLIC BLOOD PRESSURE: 128 MMHG | DIASTOLIC BLOOD PRESSURE: 63 MMHG

## 2021-09-18 VITALS — DIASTOLIC BLOOD PRESSURE: 76 MMHG | SYSTOLIC BLOOD PRESSURE: 171 MMHG

## 2021-09-18 VITALS — SYSTOLIC BLOOD PRESSURE: 153 MMHG | DIASTOLIC BLOOD PRESSURE: 79 MMHG

## 2021-09-18 VITALS — DIASTOLIC BLOOD PRESSURE: 81 MMHG | SYSTOLIC BLOOD PRESSURE: 147 MMHG

## 2021-09-18 LAB
ALBUMIN SERPL-MCNC: 2.6 G/DL (ref 3.4–5)
ALT SERPL-CCNC: 20 U/L (ref 30–65)
ANION GAP SERPL CALC-SCNC: 6 MMOL/L (ref 7–16)
AST SERPL-CCNC: 24 U/L (ref 15–37)
BASOPHILS NFR BLD AUTO: 0 % (ref 0–2)
BILIRUB DIRECT SERPL-MCNC: < 0.1 MG/DL
BILIRUB SERPL-MCNC: 0.2 MG/DL (ref 0.2–1)
BUN SERPL-MCNC: 26 MG/DL (ref 7–18)
CALCIUM SERPL-MCNC: 9.1 MG/DL (ref 8.5–10.1)
CHLORIDE SERPL-SCNC: 100 MMOL/L (ref 98–107)
CO2 SERPL-SCNC: 34 MMOL/L (ref 21–32)
CREAT SERPL-MCNC: 0.9 MG/DL (ref 0.7–1.3)
EOSINOPHIL NFR BLD: 0 % (ref 0–3)
ERYTHROCYTE [DISTWIDTH] IN BLOOD BY AUTOMATED COUNT: 14.7 % (ref 10.5–14.5)
GLUCOSE SERPL-MCNC: 209 MG/DL (ref 74–106)
GRANULOCYTES NFR BLD MANUAL: 83 % (ref 36–66)
HCT VFR BLD CALC: 26.7 % (ref 42–52)
HGB BLD-MCNC: 8.7 GM/DL (ref 14–18)
LYMPHOCYTES NFR BLD AUTO: 11 % (ref 24–44)
MCH RBC QN AUTO: 27 PG (ref 26–34)
MCHC RBC AUTO-ENTMCNC: 32.7 G/DL (ref 28–37)
MCV RBC: 82.8 FL (ref 80–100)
MONOCYTES NFR BLD: 6 % (ref 1–8)
NEUTROPHILS # BLD: 4.1 THOU/UL (ref 1.4–8.2)
PHOSPHATE SERPL-MCNC: 3.6 MG/DL (ref 2.5–4.9)
PLATELET # BLD EST: NORMAL 10*3/UL
PLATELET # BLD: 354 THOU/UL (ref 150–400)
POTASSIUM SERPL-SCNC: 4.3 MMOL/L (ref 3.5–5.1)
PROT SERPL-MCNC: 7.2 G/DL (ref 6.4–8.2)
RBC # BLD AUTO: 3.23 MIL/UL (ref 4.5–6)
RBC MORPH BLD: NORMAL
SODIUM SERPL-SCNC: 140 MMOL/L (ref 136–145)
WBC # BLD AUTO: 4.9 THOU/UL (ref 4–11)

## 2021-09-18 NOTE — NUR
PT MAKING SLOW PROGRESS TOWARDS GOALS.  JUST OVER 4L UOP NOTED.  PT ON O2 AT
2L PER NC OVERNIGHT.  NOTED O2 SAT 99% JUST PRIOR TO PT BE WOKEN UP FOR
REASSESSMENT.  LUNGS DIMINISHED THROUGHOUT.

## 2021-09-18 NOTE — NUR
RN ASSUMED PT'S CARE AT 0700-1900PM, PT IS A&OX4, PT IS OFF O2 AND HE IS ON
ROOM AIR AT AFTERNOON, PT DENIES SOB AND PAIN , PT'S VS AND O2SAT ARE STABLE
AT DAY SHIFT, PT IS CONTINUING IV ABX AND TREAT COVID MEDICATION.

## 2021-09-19 VITALS — DIASTOLIC BLOOD PRESSURE: 72 MMHG | SYSTOLIC BLOOD PRESSURE: 141 MMHG

## 2021-09-19 VITALS — DIASTOLIC BLOOD PRESSURE: 75 MMHG | SYSTOLIC BLOOD PRESSURE: 127 MMHG

## 2021-09-19 VITALS — DIASTOLIC BLOOD PRESSURE: 79 MMHG | SYSTOLIC BLOOD PRESSURE: 150 MMHG

## 2021-09-19 VITALS — DIASTOLIC BLOOD PRESSURE: 67 MMHG | SYSTOLIC BLOOD PRESSURE: 127 MMHG

## 2021-09-19 VITALS — SYSTOLIC BLOOD PRESSURE: 137 MMHG | DIASTOLIC BLOOD PRESSURE: 75 MMHG

## 2021-09-19 LAB
ALBUMIN SERPL-MCNC: 2.7 G/DL (ref 3.4–5)
ALT SERPL-CCNC: 41 U/L (ref 30–65)
ANION GAP SERPL CALC-SCNC: 5 MMOL/L (ref 7–16)
AST SERPL-CCNC: 32 U/L (ref 15–37)
BASOPHILS NFR BLD AUTO: 0.4 % (ref 0–2)
BILIRUB DIRECT SERPL-MCNC: < 0.1 MG/DL
BILIRUB SERPL-MCNC: 0.2 MG/DL (ref 0.2–1)
BUN SERPL-MCNC: 33 MG/DL (ref 7–18)
CALCIUM SERPL-MCNC: 9 MG/DL (ref 8.5–10.1)
CHLORIDE SERPL-SCNC: 96 MMOL/L (ref 98–107)
CO2 SERPL-SCNC: 37 MMOL/L (ref 21–32)
CREAT SERPL-MCNC: 1 MG/DL (ref 0.7–1.3)
EOSINOPHIL NFR BLD: 0.3 % (ref 0–3)
ERYTHROCYTE [DISTWIDTH] IN BLOOD BY AUTOMATED COUNT: 14.5 % (ref 10.5–14.5)
GLUCOSE SERPL-MCNC: 243 MG/DL (ref 74–106)
GRANULOCYTES NFR BLD MANUAL: 78.8 % (ref 36–66)
HCT VFR BLD CALC: 31.1 % (ref 42–52)
HGB BLD-MCNC: 10.3 GM/DL (ref 14–18)
LYMPHOCYTES NFR BLD AUTO: 13.4 % (ref 24–44)
MCH RBC QN AUTO: 27.3 PG (ref 26–34)
MCHC RBC AUTO-ENTMCNC: 33.2 G/DL (ref 28–37)
MCV RBC: 82.1 FL (ref 80–100)
MONOCYTES NFR BLD: 7.1 % (ref 1–8)
NEUTROPHILS # BLD: 5.2 THOU/UL (ref 1.4–8.2)
PHOSPHATE SERPL-MCNC: 3.6 MG/DL (ref 2.5–4.9)
PLATELET # BLD: 418 THOU/UL (ref 150–400)
POTASSIUM SERPL-SCNC: 3.5 MMOL/L (ref 3.5–5.1)
PROT SERPL-MCNC: 7.3 G/DL (ref 6.4–8.2)
RBC # BLD AUTO: 3.78 MIL/UL (ref 4.5–6)
SODIUM SERPL-SCNC: 138 MMOL/L (ref 136–145)
WBC # BLD AUTO: 6.7 THOU/UL (ref 4–11)

## 2021-09-19 NOTE — NUR
PT MAKING PROGRESS TOWARDS GOALS.  ON ROOM AIR THROUGHOUT THE NIGHT.
OCCASIONAL COUGH NOTED.  NO SOA NOTED WHILE AT REST.  95-99% SPO2 WHEN SPOT
CHECKED THROUGHOUT THE NIGHT.

## 2021-09-19 NOTE — NUR
Pt up in bed, moves side to side per self, a and o, on RA, goal today was to
get rest r/t he did not sleep well last night.  Pt labs were drawn my nurse,
lonny, lab sent to lab, doctor informed of results, call light with in reach,
waching TV no concerns at this time.

## 2021-09-20 VITALS — DIASTOLIC BLOOD PRESSURE: 72 MMHG | SYSTOLIC BLOOD PRESSURE: 152 MMHG

## 2021-09-20 VITALS — SYSTOLIC BLOOD PRESSURE: 125 MMHG | DIASTOLIC BLOOD PRESSURE: 76 MMHG

## 2021-09-20 VITALS — SYSTOLIC BLOOD PRESSURE: 127 MMHG | DIASTOLIC BLOOD PRESSURE: 73 MMHG

## 2021-09-20 VITALS — DIASTOLIC BLOOD PRESSURE: 76 MMHG | SYSTOLIC BLOOD PRESSURE: 139 MMHG

## 2021-09-20 VITALS — DIASTOLIC BLOOD PRESSURE: 90 MMHG | SYSTOLIC BLOOD PRESSURE: 158 MMHG

## 2021-09-20 LAB
ALBUMIN SERPL-MCNC: 2.7 G/DL (ref 3.4–5)
ALT SERPL-CCNC: 39 U/L (ref 30–65)
ANION GAP SERPL CALC-SCNC: 6 MMOL/L (ref 7–16)
AST SERPL-CCNC: 22 U/L (ref 15–37)
BASOPHILS NFR BLD AUTO: 0.2 % (ref 0–2)
BILIRUB DIRECT SERPL-MCNC: < 0.1 MG/DL
BILIRUB SERPL-MCNC: 0.2 MG/DL (ref 0.2–1)
BUN SERPL-MCNC: 35 MG/DL (ref 7–18)
CALCIUM SERPL-MCNC: 8.9 MG/DL (ref 8.5–10.1)
CHLORIDE SERPL-SCNC: 96 MMOL/L (ref 98–107)
CO2 SERPL-SCNC: 34 MMOL/L (ref 21–32)
CREAT SERPL-MCNC: 1 MG/DL (ref 0.7–1.3)
EOSINOPHIL NFR BLD: 0 % (ref 0–3)
ERYTHROCYTE [DISTWIDTH] IN BLOOD BY AUTOMATED COUNT: 14.8 % (ref 10.5–14.5)
GLUCOSE SERPL-MCNC: 253 MG/DL (ref 74–106)
GRANULOCYTES NFR BLD MANUAL: 86.4 % (ref 36–66)
HCT VFR BLD CALC: 31.3 % (ref 42–52)
HGB BLD-MCNC: 10.4 GM/DL (ref 14–18)
LYMPHOCYTES NFR BLD AUTO: 7.5 % (ref 24–44)
MCH RBC QN AUTO: 27.1 PG (ref 26–34)
MCHC RBC AUTO-ENTMCNC: 33.1 G/DL (ref 28–37)
MCV RBC: 82 FL (ref 80–100)
MONOCYTES NFR BLD: 5.9 % (ref 1–8)
NEUTROPHILS # BLD: 5.7 THOU/UL (ref 1.4–8.2)
PHOSPHATE SERPL-MCNC: 4.1 MG/DL (ref 2.5–4.9)
PLATELET # BLD: 410 THOU/UL (ref 150–400)
POTASSIUM SERPL-SCNC: 3.7 MMOL/L (ref 3.5–5.1)
PROT SERPL-MCNC: 7.3 G/DL (ref 6.4–8.2)
RBC # BLD AUTO: 3.82 MIL/UL (ref 4.5–6)
SODIUM SERPL-SCNC: 136 MMOL/L (ref 136–145)
WBC # BLD AUTO: 6.6 THOU/UL (ref 4–11)

## 2021-09-20 NOTE — NUR
HAILEY reviewed chart and spoke with nursing and attending physician. Pt remains
in Enhanced Isolation due to COVID. Pt is afebrile and not requiring O2. Pt is
progressing towards goals for discharge. [facility] has evaluated pt and can accept pt
pending insurance auth. HAILEY spoke with pt via phone to discuss inpt acute rehab
options. Pt states that he would like referrals to be sent to [facility]
and [facility]. SW explained process for referral, acceptance and
insurance auth. Pt verbalized understanding. HAILEY faxed referral to [facility]
and notified liaison, Alaina, of new referral. HAILEY also faxed to [facility] inpt
acute rehab and spoke with Benoit in intake. Both facilities to review
referral. HAILEY updated  rehab liaison. HAILEY is following to assist as needed
with discharge planning.

## 2021-09-20 NOTE — NUR
PT IS PLEASENT AND ALERT AND ORIENTED X 4. PT ON RA, BUT IS SOA WITH MOVEMENT.
PT HAS L BKA AND IS WEAK. PT IS WORKIN WITH PT/OT AND WAITING ON
REHAB PLACEMENT. PT ON FALL PRECAUTIONS AND IS COMFORTABLE AT BEDSIDE. PT
DENIES ANY NEEDS AT THE MOMENT, WILL CONTINUE TO MONITOR.

## 2021-09-20 NOTE — NUR
room air desat study tonight. he has maintained o2 sats in the med 90's.
encouraged to take low sodium snacks and to watch his water intake. he has a
flat affect and becomes withdrawn when education offered. blood pressure
within normal limits. no discharge concerns voiced.

## 2021-09-21 VITALS — SYSTOLIC BLOOD PRESSURE: 136 MMHG | DIASTOLIC BLOOD PRESSURE: 81 MMHG

## 2021-09-21 VITALS — DIASTOLIC BLOOD PRESSURE: 69 MMHG | SYSTOLIC BLOOD PRESSURE: 122 MMHG

## 2021-09-21 VITALS — DIASTOLIC BLOOD PRESSURE: 81 MMHG | SYSTOLIC BLOOD PRESSURE: 146 MMHG

## 2021-09-21 VITALS — DIASTOLIC BLOOD PRESSURE: 70 MMHG | SYSTOLIC BLOOD PRESSURE: 146 MMHG

## 2021-09-21 VITALS — DIASTOLIC BLOOD PRESSURE: 86 MMHG | SYSTOLIC BLOOD PRESSURE: 166 MMHG

## 2021-09-21 NOTE — NUR
HAILEY reviewed chart and spoke with nursing and attending physician. Pt remains
in Enhanced Isolation due to COVID. Pt is afebrile and not requiring O2. HAILEY
spoke with pt via phone to provide update and discuss options for inpt acute
rehab. HAILEY explained that [facility] is only accepting internal pts at this time,
AND would require 20 days of isolation prior to consideration. [facility] is also not willing to consider pt until 20 days  of isolation. HAILEY
discussed option for [facility] and also remaining at [facility] for
acute rehab. HAILEY explained that pt would remain in his current room until he
can come out of isolation. Pt verbalized understanding and is agreeable with
referral to [facility]. Pt asked if he could start rehab here and then transfer or
d/c to another acute rehab facility once out of isolation. HAILEY left voice
message on [place] help desk line to see if this would be possible. HAILEY
faxed referral to [facility]. Confirmed info was received with liaison, who is
reviewing and will need to speak with pt regarding discharge plan. HAILEY
requested COVID test result from [facility] post acute liaison. HAILEY also
asked if [facility] can accept pt back after rehab stay, if needed. Awaiting
info at this time. HAILEY placed call to pt's room. No answer. HAILEY left voice
message on pt's cell phone: [phone]. HAILEY is following to assist as needed
with discharge planning.

## 2021-09-21 NOTE — NUR
PT ALERT AND ORIENTED X4, DENIES ANY PAIN. PT IS PROGRESSING TOWARDS POC.
PT HAS BEEN WORKING WITH PT/OT. ANTICIPATING FOR D/C TOP REHAB SOON. CONTINUE
TO BE IN ENHANCED PRECAUTIONS. DENIES ANY NEEDS MARIA ISABEL.

## 2021-09-21 NOTE — NUR
Patient progressing towards outcome goals. Oxygenation optimal on room air.
Vital signs and rhythm stable. Blood sugars elevated sliding scale insulin
increased to high per protocol.

## 2021-09-22 VITALS — DIASTOLIC BLOOD PRESSURE: 67 MMHG | SYSTOLIC BLOOD PRESSURE: 136 MMHG

## 2021-09-22 VITALS — DIASTOLIC BLOOD PRESSURE: 82 MMHG | SYSTOLIC BLOOD PRESSURE: 133 MMHG

## 2021-09-22 VITALS — SYSTOLIC BLOOD PRESSURE: 157 MMHG | DIASTOLIC BLOOD PRESSURE: 87 MMHG

## 2021-09-22 VITALS — DIASTOLIC BLOOD PRESSURE: 79 MMHG | SYSTOLIC BLOOD PRESSURE: 134 MMHG

## 2021-09-22 LAB
ANION GAP SERPL CALC-SCNC: 13 MMOL/L (ref 7–16)
BUN SERPL-MCNC: 76 MG/DL (ref 7–18)
CALCIUM SERPL-MCNC: 8.8 MG/DL (ref 8.5–10.1)
CHLORIDE SERPL-SCNC: 94 MMOL/L (ref 98–107)
CO2 SERPL-SCNC: 25 MMOL/L (ref 21–32)
CREAT SERPL-MCNC: 1.4 MG/DL (ref 0.7–1.3)
ERYTHROCYTE [DISTWIDTH] IN BLOOD BY AUTOMATED COUNT: 15.2 % (ref 10.5–14.5)
GLUCOSE SERPL-MCNC: 187 MG/DL (ref 74–106)
HCT VFR BLD CALC: 33.1 % (ref 42–52)
HGB BLD-MCNC: 11.1 GM/DL (ref 14–18)
MAGNESIUM SERPL-MCNC: 1.5 MG/DL (ref 1.8–2.4)
MCH RBC QN AUTO: 27.3 PG (ref 26–34)
MCHC RBC AUTO-ENTMCNC: 33.6 G/DL (ref 28–37)
MCV RBC: 81.1 FL (ref 80–100)
PLATELET # BLD: 501 THOU/UL (ref 150–400)
POTASSIUM SERPL-SCNC: 4.1 MMOL/L (ref 3.5–5.1)
RBC # BLD AUTO: 4.09 MIL/UL (ref 4.5–6)
SODIUM SERPL-SCNC: 132 MMOL/L (ref 136–145)
WBC # BLD AUTO: 10.4 THOU/UL (ref 4–11)

## 2021-09-22 NOTE — NUR
[facility] updated and clinical faxed. They have spoken with the pt via phone to
discuss his dc goal to return home with his mom. Their covid team is reviewing
and they are checking their bed availability. [facility] is following as well should
they not be able to accept. Pt is likely dc ready today if [facility] can accept.
Discussed with the care team.

## 2021-09-22 NOTE — NUR
PROGRESS
 
PT A/O X4, UP TO W/C WITH SLIDEBOARD AND SBA. STAYED IN CHAIR ALL DAY AND
TRANSFERRED SELF BACK TO BED WITH SLIDEBOARD. VSS. LUNGS DIMINISHED BILATERAL
NO COUGH NOTED, PT DENIES SOB. VOIDING LARGE AMOUNTS OF CLEAR YELLOW URINE
FLUID RESTRICTION CONTINUES. LEFT STUMP WITH HEALING INCISION 3 SCABBED AREAS
NOTED WITH NO DRAINAGE AND NO S/S OF INFECTION. PLAN IS TO BE DISCHARGED TO
REHAB WHEN BED IS AVAILABLE.

## 2021-09-22 NOTE — NUR
PT IS PROGRESSING TOWARDS CARE. UP IN THE WHEELCHAIR MARIA ISABEL. CONTINUE TO BE ON
ROOM AIR, SOME SOB WITH EXERTION. PT HAS BEEN WORKING WITH PT/OT. ENHANCED
PRECAUTION IN PLACE. ANTICIPATING FOR D/C TO REHAB SOON.

## 2021-09-23 VITALS — SYSTOLIC BLOOD PRESSURE: 121 MMHG | DIASTOLIC BLOOD PRESSURE: 64 MMHG

## 2021-09-23 VITALS — SYSTOLIC BLOOD PRESSURE: 142 MMHG | DIASTOLIC BLOOD PRESSURE: 78 MMHG

## 2021-09-23 VITALS — SYSTOLIC BLOOD PRESSURE: 130 MMHG | DIASTOLIC BLOOD PRESSURE: 80 MMHG

## 2021-09-23 VITALS — DIASTOLIC BLOOD PRESSURE: 68 MMHG | SYSTOLIC BLOOD PRESSURE: 138 MMHG

## 2021-09-23 LAB
ANION GAP SERPL CALC-SCNC: 10 MMOL/L (ref 7–16)
BUN SERPL-MCNC: 70 MG/DL (ref 7–18)
CALCIUM SERPL-MCNC: 8.9 MG/DL (ref 8.5–10.1)
CHLORIDE SERPL-SCNC: 97 MMOL/L (ref 98–107)
CO2 SERPL-SCNC: 29 MMOL/L (ref 21–32)
CREAT SERPL-MCNC: 1.2 MG/DL (ref 0.7–1.3)
ERYTHROCYTE [DISTWIDTH] IN BLOOD BY AUTOMATED COUNT: 15.3 % (ref 10.5–14.5)
GLUCOSE SERPL-MCNC: 132 MG/DL (ref 74–106)
HCT VFR BLD CALC: 31.2 % (ref 42–52)
HGB BLD-MCNC: 10.5 GM/DL (ref 14–18)
MCH RBC QN AUTO: 27.4 PG (ref 26–34)
MCHC RBC AUTO-ENTMCNC: 33.7 G/DL (ref 28–37)
MCV RBC: 81.4 FL (ref 80–100)
PLATELET # BLD: 464 THOU/UL (ref 150–400)
POTASSIUM SERPL-SCNC: 3.7 MMOL/L (ref 3.5–5.1)
RBC # BLD AUTO: 3.84 MIL/UL (ref 4.5–6)
SODIUM SERPL-SCNC: 136 MMOL/L (ref 136–145)
WBC # BLD AUTO: 10.1 THOU/UL (ref 4–11)

## 2021-09-23 NOTE — NUR
PROGRESS
 
PT PROGRESSING LUNGS CLEAR ON ROOM AIR ACCUCHECKS AND SSI CONTINUE. VOIDING
QS. COVID TREATMENT COMPLETED. WAITING ON APPROVAL TO TRANSFER TO REHAB. SPENT
THE DAY UP IN W/C AND TRANSFERRED SELF TO BED USING SLIDE BOARD AND SBA. VSS
TELEMETRY INTACT VOIDING QS, FLUID RESTRICTION CONTINUES. CONTINUE POC.

## 2021-09-23 NOTE — NUR
HAILEY reviewed chart and spoke with nursing and attending physician. Pt remains
in Enhanced Isolation due to COVID. Pt is afebrile and not requiring O2. HAILEY
discussed case with [facility] liaison, who states they are
able to accept pt. They will have an isolation room available tomorrow. HAILEY
left voice message for pt on his cell phone: [phone] to provide update.
HAILEY is following to assist as needed with discharge planning.

## 2021-09-24 VITALS — DIASTOLIC BLOOD PRESSURE: 54 MMHG | SYSTOLIC BLOOD PRESSURE: 115 MMHG

## 2021-09-24 VITALS — SYSTOLIC BLOOD PRESSURE: 121 MMHG | DIASTOLIC BLOOD PRESSURE: 57 MMHG

## 2021-09-24 VITALS — DIASTOLIC BLOOD PRESSURE: 76 MMHG | SYSTOLIC BLOOD PRESSURE: 136 MMHG

## 2021-09-24 VITALS — SYSTOLIC BLOOD PRESSURE: 124 MMHG | DIASTOLIC BLOOD PRESSURE: 71 MMHG

## 2021-09-24 NOTE — NUR
Pt. stated he slept some during the night. Tolerating room air well with no
respiratory distress. Cont. on enhanced precaution , afebrile. Voiding per
urinal. Denies any pain. Anticipating DC today . Making some progress towards
discharge goals.

## 2021-09-25 VITALS — SYSTOLIC BLOOD PRESSURE: 137 MMHG | DIASTOLIC BLOOD PRESSURE: 81 MMHG

## 2021-09-25 VITALS — SYSTOLIC BLOOD PRESSURE: 146 MMHG | DIASTOLIC BLOOD PRESSURE: 81 MMHG

## 2021-09-25 VITALS — SYSTOLIC BLOOD PRESSURE: 127 MMHG | DIASTOLIC BLOOD PRESSURE: 76 MMHG

## 2021-09-25 VITALS — DIASTOLIC BLOOD PRESSURE: 75 MMHG | SYSTOLIC BLOOD PRESSURE: 139 MMHG

## 2021-09-25 LAB
ANION GAP SERPL CALC-SCNC: 10 MMOL/L (ref 7–16)
BUN SERPL-MCNC: 66 MG/DL (ref 7–18)
CALCIUM SERPL-MCNC: 8.7 MG/DL (ref 8.5–10.1)
CHLORIDE SERPL-SCNC: 103 MMOL/L (ref 98–107)
CO2 SERPL-SCNC: 27 MMOL/L (ref 21–32)
CREAT SERPL-MCNC: 1.4 MG/DL (ref 0.7–1.3)
ERYTHROCYTE [DISTWIDTH] IN BLOOD BY AUTOMATED COUNT: 15.5 % (ref 10.5–14.5)
GLUCOSE SERPL-MCNC: 62 MG/DL (ref 74–106)
HCT VFR BLD CALC: 28.1 % (ref 42–52)
HGB BLD-MCNC: 9.3 GM/DL (ref 14–18)
MCH RBC QN AUTO: 27.4 PG (ref 26–34)
MCHC RBC AUTO-ENTMCNC: 33.2 G/DL (ref 28–37)
MCV RBC: 82.7 FL (ref 80–100)
PLATELET # BLD: 334 THOU/UL (ref 150–400)
POTASSIUM SERPL-SCNC: 4.1 MMOL/L (ref 3.5–5.1)
RBC # BLD AUTO: 3.4 MIL/UL (ref 4.5–6)
SODIUM SERPL-SCNC: 140 MMOL/L (ref 136–145)
WBC # BLD AUTO: 8.5 THOU/UL (ref 4–11)

## 2021-09-25 NOTE — NUR
FOLLOWING POC FOR COVID.  PT TO BSC USING SLIDING BOARD.  VSS OVERNIGHT.  PT
IN [facility] AND IS READY TO DC TO [facility] REHAB.  NO OTHER COMPLIANTS
OVERNIGHT. n/a

## 2021-09-25 NOTE — NUR
assumed care of pt at 0700. pt aox4 no acute distress.  room air. voicing no
concerns.  up to bsc w/ sliding board.  bathed with staff assistance.  waiting
on rehab bed - anticipate d/c monday.

## 2021-09-26 VITALS — SYSTOLIC BLOOD PRESSURE: 112 MMHG | DIASTOLIC BLOOD PRESSURE: 72 MMHG

## 2021-09-26 VITALS — DIASTOLIC BLOOD PRESSURE: 72 MMHG | SYSTOLIC BLOOD PRESSURE: 112 MMHG

## 2021-09-26 VITALS — SYSTOLIC BLOOD PRESSURE: 128 MMHG | DIASTOLIC BLOOD PRESSURE: 62 MMHG

## 2021-09-26 VITALS — SYSTOLIC BLOOD PRESSURE: 115 MMHG | DIASTOLIC BLOOD PRESSURE: 64 MMHG

## 2021-09-26 VITALS — SYSTOLIC BLOOD PRESSURE: 132 MMHG | DIASTOLIC BLOOD PRESSURE: 78 MMHG

## 2021-09-26 NOTE — NUR
RN ASSUMED PT'S CARE AT 0700AM, PT IS A&OX4, PT IS CONTINUING COVID ISOLATION,
PT IS ON ROOM AIR, PT'S VS AND O2SAT ARE STABLE, PT DENIES SOB AND PAIN AT DAY
SHIFT, PT GETS UP TO W/C WITH ASSIST, PT MAY DC TO REHAB FACILITY TOMORROW.

## 2021-09-26 NOTE — NUR
VSS OVERNIGHT.  PT IS EDUCATED ON HIS MEDICATIONS.  ACCU CHECK AT 2100 
WITH 10U GIVEN.  PT CAN SLIDE HIMSELF TO BSC.  ISOLATION AND FALL PRECAUTIONS
IN PLACE.

## 2021-09-27 VITALS — SYSTOLIC BLOOD PRESSURE: 140 MMHG | DIASTOLIC BLOOD PRESSURE: 77 MMHG

## 2021-09-27 VITALS — DIASTOLIC BLOOD PRESSURE: 70 MMHG | SYSTOLIC BLOOD PRESSURE: 122 MMHG

## 2021-09-27 VITALS — DIASTOLIC BLOOD PRESSURE: 68 MMHG | SYSTOLIC BLOOD PRESSURE: 130 MMHG

## 2021-09-27 VITALS — SYSTOLIC BLOOD PRESSURE: 136 MMHG | DIASTOLIC BLOOD PRESSURE: 79 MMHG

## 2021-09-27 NOTE — NUR
HAILEY reviewed chart and spoke with nursing and attending physician. Pt remains
in Enhanced Isolation due to COVID. Pt is medically stable for discharge to
[facility]. HAILEY discussed with [facility], who states they do
not have an isolation bed today. Pt will be at 20 days post positive test
tomorrow and will be able to be admitted to their regular unit. HAILEY spoke with
pt via phone to provide update. Pt is aware and verbalized understanding. SW
updated attending physician. SW is following to assist as needed with
discharge planning.

## 2021-09-27 NOTE — NUR
RN ASSUMED PT'S CARE AT 0700-1900PM. PT IS A&OX4, PT IS ROOM AIR , PT'S VS ARE
STABLE, PT DENIES SOB AND PAIN AT DAY SHIFT, PT HAS PT/OT TO WORK WITH HIM, PT
WILL DC TO REHAB FACILITY TOMORROW.

## 2021-09-27 NOTE — NUR
PT SITTING UP IN BED. ON ROOM AIR. LUNGS CRACKLES IN BASES DIMINISHED MID,
AFTER BREATHING TREATMENT, CRACKLES BASES AND CLEAR MID AND UPPER. PALE SKIN
TONE, OBESE, LBKA. R BLE EDEMA. PT DECLINED HS SNACK. PT PLANS ON DC IN AM,
STATES HE HAS NOT SEEN HIS FAMILY IN 90 DAYS.

## 2021-09-27 NOTE — NUR
PT READY TO DC TO [facility] REHAB.  HELPING PT SET SHORT TERM GOAL AFTER DC.
PT UP TO BSC WITH SLIDER BOARD AND HAD A BM, ALSO TOOK SHOWER YESTERDAY.  VSS.
AFEBRILE.  FALL AND ISOLATION PRECAUTIONS.

## 2021-09-28 VITALS — DIASTOLIC BLOOD PRESSURE: 89 MMHG | SYSTOLIC BLOOD PRESSURE: 139 MMHG

## 2021-09-28 VITALS — SYSTOLIC BLOOD PRESSURE: 92 MMHG | DIASTOLIC BLOOD PRESSURE: 56 MMHG

## 2021-09-28 VITALS — DIASTOLIC BLOOD PRESSURE: 68 MMHG | SYSTOLIC BLOOD PRESSURE: 119 MMHG

## 2021-09-28 VITALS — DIASTOLIC BLOOD PRESSURE: 56 MMHG | SYSTOLIC BLOOD PRESSURE: 92 MMHG

## 2021-09-28 NOTE — NUR
THIS RN SPOKE TO DR. INFANTE ABOUT RED AND WARM AREA ON LEFT LOWER QUADRANT IN
AREA LOVENOX SHOT WAS ADMINISTERED. DR. INFANTE ADVISED TO KEEP AN EYE ON IT AT
NEW FACILITY AND HAVE THEIR DR LOOK AT IT IF IT GETS WORSE. WILL REPORT THIS
TO RN AT NEW FACILITY AND WILL CONTINUE TO MONITOR.

## 2021-09-28 NOTE — NUR
DISCHARGE NOTE:
HAILEY reviewed chart and spoke with nursing and attending physician. Pt is
medically stable for discharge to [facility] today. HAILEY faxed
updated info to [facility] liaison for review. HAILEY spoke with Ernestina in intake
who states they are able to accept pt today. Wheelchair van transportation
scheduled for 1400 per [facility]'s arrangements. HAILEY has left two voice messages for
pt to provide update and to notify of discharge plan. HAILEY updated nursing and
requested chart copy. Nursing to call report. No additional HAILEY needs
identified at this time, but is available to assist should needs arise.

## 2021-09-28 NOTE — NUR
THIS RN DISCHARGED PT AT 1435 WITH TRANSPORT AND WHEELCHAIR. PT HEADING TO [facility] FOR REHAB.
GAVE REPORT TO YARELY BEFORE DISCHARGE AND GAVE
RETURN NUMBER IF ANY QUESTIONS ARISE. PT EDUCATION PROVIDED ABOUT DISCHARGE,
INCLUDING NEW MEDICATIONS. PT SIGNED UNDERSTANDING OF DISCHARGE INFORMATION
AND IS IN PT CHART. PT LEFT WITH ALL BELONGINGS.